# Patient Record
Sex: FEMALE | URBAN - METROPOLITAN AREA
[De-identification: names, ages, dates, MRNs, and addresses within clinical notes are randomized per-mention and may not be internally consistent; named-entity substitution may affect disease eponyms.]

---

## 2021-06-09 ENCOUNTER — WEB ENCOUNTER (OUTPATIENT)
Dept: URBAN - METROPOLITAN AREA CLINIC 96 | Facility: CLINIC | Age: 26
End: 2021-06-09

## 2021-06-24 ENCOUNTER — DASHBOARD ENCOUNTERS (OUTPATIENT)
Age: 26
End: 2021-06-24

## 2021-06-24 ENCOUNTER — WEB ENCOUNTER (OUTPATIENT)
Dept: URBAN - METROPOLITAN AREA CLINIC 96 | Facility: CLINIC | Age: 26
End: 2021-06-24

## 2021-06-24 ENCOUNTER — OFFICE VISIT (OUTPATIENT)
Dept: URBAN - METROPOLITAN AREA CLINIC 96 | Facility: CLINIC | Age: 26
End: 2021-06-24
Payer: COMMERCIAL

## 2021-06-24 VITALS
HEART RATE: 81 BPM | DIASTOLIC BLOOD PRESSURE: 83 MMHG | SYSTOLIC BLOOD PRESSURE: 116 MMHG | TEMPERATURE: 98.2 F | HEIGHT: 62 IN | BODY MASS INDEX: 20.06 KG/M2 | WEIGHT: 109 LBS

## 2021-06-24 DIAGNOSIS — K58.2 IRRITABLE BOWEL SYNDROME WITH BOTH CONSTIPATION AND DIARRHEA: ICD-10-CM

## 2021-06-24 DIAGNOSIS — R05 COUGH: ICD-10-CM

## 2021-06-24 DIAGNOSIS — R11.0 NAUSEA: ICD-10-CM

## 2021-06-24 PROCEDURE — 99204 OFFICE O/P NEW MOD 45 MIN: CPT | Performed by: INTERNAL MEDICINE

## 2021-06-24 RX ORDER — NORETHINDRONE ACETATE AND ETHINYL ESTRADIOL 1MG-20(21)
1 TABLET KIT ORAL ONCE A DAY
Status: ACTIVE | COMMUNITY

## 2021-06-24 RX ORDER — SERTRALINE HYDROCHLORIDE 100 MG/1
1 TABLET TABLET, FILM COATED ORAL ONCE A DAY
Status: ACTIVE | COMMUNITY

## 2021-06-24 NOTE — HPI-TODAY'S VISIT:
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

## 2021-06-26 LAB
DEAMIDATED GLIADIN ABS, IGA: 8
DEAMIDATED GLIADIN ABS, IGG: 2
ENDOMYSIAL ANTIBODY IGA: NEGATIVE
H PYLORI, IGM ABS: <9
H. PYLORI, IGA ABS: <9
H. PYLORI, IGG ABS: 0.34
HEMATOCRIT: 47.5
HEMOGLOBIN: 15.5
IMMUNOGLOBULIN A, QN, SERUM: 251
MCH: 30.5
MCHC: 32.6
MCV: 94
NRBC: (no result)
PLATELETS: 292
RBC: 5.08
RDW: 11.7
T-TRANSGLUTAMINASE (TTG) IGA: 3
T-TRANSGLUTAMINASE (TTG) IGG: 3
T4,FREE(DIRECT): 1
TSH: 2.82
WBC: 5.6

## 2022-06-21 ENCOUNTER — OFFICE VISIT (OUTPATIENT)
Dept: URBAN - METROPOLITAN AREA TELEHEALTH 2 | Facility: TELEHEALTH | Age: 27
End: 2022-06-21

## 2022-06-21 ENCOUNTER — OFFICE VISIT (OUTPATIENT)
Dept: URBAN - METROPOLITAN AREA TELEHEALTH 2 | Facility: TELEHEALTH | Age: 27
End: 2022-06-21
Payer: COMMERCIAL

## 2022-06-21 DIAGNOSIS — K58.2 IRRITABLE BOWEL SYNDROME WITH BOTH CONSTIPATION AND DIARRHEA: ICD-10-CM

## 2022-06-21 PROCEDURE — 97802 MEDICAL NUTRITION INDIV IN: CPT | Performed by: DIETITIAN, REGISTERED

## 2022-06-21 RX ORDER — NORETHINDRONE ACETATE AND ETHINYL ESTRADIOL 1MG-20(21)
1 TABLET KIT ORAL ONCE A DAY
Status: ACTIVE | COMMUNITY

## 2022-06-21 RX ORDER — SERTRALINE HYDROCHLORIDE 100 MG/1
1 TABLET TABLET, FILM COATED ORAL ONCE A DAY
Status: ACTIVE | COMMUNITY

## 2022-07-26 ENCOUNTER — OFFICE VISIT (OUTPATIENT)
Dept: URBAN - METROPOLITAN AREA TELEHEALTH 2 | Facility: TELEHEALTH | Age: 27
End: 2022-07-26

## 2022-07-26 RX ORDER — NORETHINDRONE ACETATE AND ETHINYL ESTRADIOL 1MG-20(21)
1 TABLET KIT ORAL ONCE A DAY
Status: ACTIVE | COMMUNITY

## 2022-07-26 RX ORDER — SERTRALINE HYDROCHLORIDE 100 MG/1
1 TABLET TABLET, FILM COATED ORAL ONCE A DAY
Status: ACTIVE | COMMUNITY

## 2022-07-27 ENCOUNTER — OFFICE VISIT (OUTPATIENT)
Dept: URBAN - METROPOLITAN AREA TELEHEALTH 2 | Facility: TELEHEALTH | Age: 27
End: 2022-07-27
Payer: COMMERCIAL

## 2022-07-27 DIAGNOSIS — K58.2 IRRITABLE BOWEL SYNDROME WITH BOTH CONSTIPATION AND DIARRHEA: ICD-10-CM

## 2022-07-27 PROCEDURE — 97803 MED NUTRITION INDIV SUBSEQ: CPT | Performed by: DIETITIAN, REGISTERED

## 2022-07-27 RX ORDER — NORETHINDRONE ACETATE AND ETHINYL ESTRADIOL 1MG-20(21)
1 TABLET KIT ORAL ONCE A DAY
Status: ACTIVE | COMMUNITY

## 2022-07-27 RX ORDER — SERTRALINE HYDROCHLORIDE 100 MG/1
1 TABLET TABLET, FILM COATED ORAL ONCE A DAY
Status: ACTIVE | COMMUNITY

## 2022-07-28 PROBLEM — 10743008: Status: ACTIVE | Noted: 2021-06-24
